# Patient Record
Sex: FEMALE | Race: WHITE | NOT HISPANIC OR LATINO | Employment: FULL TIME | URBAN - NONMETROPOLITAN AREA
[De-identification: names, ages, dates, MRNs, and addresses within clinical notes are randomized per-mention and may not be internally consistent; named-entity substitution may affect disease eponyms.]

---

## 2018-10-31 ENCOUNTER — OFFICE VISIT (OUTPATIENT)
Dept: RETAIL CLINIC | Facility: CLINIC | Age: 63
End: 2018-10-31

## 2018-10-31 DIAGNOSIS — Z23 NEED FOR VACCINATION: Primary | ICD-10-CM

## 2018-10-31 NOTE — PROGRESS NOTES
Subjective   Jocelyn Chung is a 63 y.o. female.     Reason for Appointment  Vaccine    History of Present Illness  Jocelyn Chung requests Influenza vaccine.  She denies acute moderate or severe illness with fever or previous severe reaction to vaccinations. She denies allergy to eggs, latex and any component to vaccines. See scanned documents.    Assessments  There are no diagnoses linked to this encounter.      There is no immunization history on file for this patient.    Notes:  You have been given the flu vaccination today. You have been given a copy of the vaccine information sheet (VIS) and verbalized understanding of risks and benefits of receiving the vaccine. You have been counseled on common side effects such as low grade fever, headache, and injection site tenderness/redness that may occur over next 1-2 days. Report serious symptoms such as swelling or trouble breathing immediately or go to the nearest emergency room. You have voiced understanding of all instructions.

## 2019-11-03 ENCOUNTER — IMMUNIZATION (OUTPATIENT)
Dept: RETAIL CLINIC | Facility: CLINIC | Age: 64
End: 2019-11-03

## 2019-11-03 DIAGNOSIS — Z23 INFLUENZA VACCINE ADMINISTERED: Primary | ICD-10-CM

## 2019-11-03 PROCEDURE — 90653 IIV ADJUVANT VACCINE IM: CPT | Performed by: NURSE PRACTITIONER

## 2019-11-03 PROCEDURE — 90471 IMMUNIZATION ADMIN: CPT | Performed by: NURSE PRACTITIONER

## 2019-11-03 NOTE — PROGRESS NOTES
Subjective   Jocelyn Chung is a 64 y.o. female.     Reason for Appointment  Vaccine    History of Present Illness  Jocelyn Chung requests Influenza High Dose vaccine.  She denies acute moderate or severe illness with fever or previous severe reaction to vaccinations. She denies allergy to eggs, latex and any component to vaccines. See scanned documents.    Assessments  Diagnoses and all orders for this visit:    Influenza vaccine administered  -     Fluad Tri 65yr+        Immunization History   Administered Date(s) Administered   • FLUAD TRI 65YR+ 11/03/2019   • FLUARIX/FLUZONE/AFLURIA/FLULAVAL QUAD 10/31/2018       Notes:  You have been given the Influenza High Dose  vaccination today. You have been given a copy of the vaccine information sheet (VIS) and verbalized understanding of risks and benefits of receiving the vaccine. You have been counseled on common side effects such as low grade fever, headache, and injection site tenderness/redness that may occur over next 1-2 days. Report serious symptoms such as swelling or trouble breathing immediately or go to the nearest emergency room. You have voiced understanding of all instructions.

## 2021-09-21 ENCOUNTER — TRANSCRIBE ORDERS (OUTPATIENT)
Dept: LAB | Facility: HOSPITAL | Age: 66
End: 2021-09-21

## 2021-09-21 ENCOUNTER — LAB (OUTPATIENT)
Dept: LAB | Facility: HOSPITAL | Age: 66
End: 2021-09-21

## 2021-09-21 DIAGNOSIS — Z20.822 COVID-19 RULED OUT: Primary | ICD-10-CM

## 2021-09-21 DIAGNOSIS — Z20.822 COVID-19 RULED OUT: ICD-10-CM

## 2021-09-21 PROCEDURE — U0004 COV-19 TEST NON-CDC HGH THRU: HCPCS

## 2021-09-22 LAB — SARS-COV-2 RNA NOSE QL NAA+PROBE: NOT DETECTED
